# Patient Record
Sex: MALE | Race: WHITE | NOT HISPANIC OR LATINO | Employment: OTHER | URBAN - NONMETROPOLITAN AREA
[De-identification: names, ages, dates, MRNs, and addresses within clinical notes are randomized per-mention and may not be internally consistent; named-entity substitution may affect disease eponyms.]

---

## 2017-05-12 ENCOUNTER — TRANSCRIBE ORDERS (OUTPATIENT)
Dept: ADMINISTRATIVE | Facility: HOSPITAL | Age: 82
End: 2017-05-12

## 2017-05-12 DIAGNOSIS — J98.4 OTHER DISEASES OF LUNG, NOT ELSEWHERE CLASSIFIED: Primary | ICD-10-CM

## 2017-05-18 ENCOUNTER — HOSPITAL ENCOUNTER (OUTPATIENT)
Dept: CT IMAGING | Facility: HOSPITAL | Age: 82
Discharge: HOME | End: 2017-05-18
Attending: INTERNAL MEDICINE | Admitting: INTERNAL MEDICINE

## 2017-05-18 DIAGNOSIS — J98.4 OTHER DISEASES OF LUNG, NOT ELSEWHERE CLASSIFIED: ICD-10-CM

## 2017-05-18 PROCEDURE — 71250 CT THORAX DX C-: CPT

## 2019-01-01 ENCOUNTER — APPOINTMENT (OUTPATIENT)
Dept: GENERAL RADIOLOGY | Facility: HOSPITAL | Age: 84
End: 2019-01-01

## 2019-01-01 ENCOUNTER — HOSPITAL ENCOUNTER (INPATIENT)
Facility: HOSPITAL | Age: 84
LOS: 3 days | End: 2019-08-01
Attending: FAMILY MEDICINE | Admitting: INTERNAL MEDICINE

## 2019-01-01 VITALS
SYSTOLIC BLOOD PRESSURE: 92 MMHG | RESPIRATION RATE: 14 BRPM | WEIGHT: 105.1 LBS | HEIGHT: 71 IN | TEMPERATURE: 97.4 F | HEART RATE: 72 BPM | DIASTOLIC BLOOD PRESSURE: 56 MMHG | OXYGEN SATURATION: 96 % | BODY MASS INDEX: 14.71 KG/M2

## 2019-01-01 DIAGNOSIS — R13.10 DYSPHAGIA, UNSPECIFIED TYPE: ICD-10-CM

## 2019-01-01 DIAGNOSIS — J93.9 PNEUMOTHORAX, UNSPECIFIED TYPE: Primary | ICD-10-CM

## 2019-01-01 DIAGNOSIS — R94.31 ABNORMAL EKG: ICD-10-CM

## 2019-01-01 DIAGNOSIS — R13.12 OROPHARYNGEAL DYSPHAGIA: ICD-10-CM

## 2019-01-01 DIAGNOSIS — Z78.9 IMPAIRED MOBILITY AND ADLS: ICD-10-CM

## 2019-01-01 DIAGNOSIS — Z74.09 DECREASED FUNCTIONAL MOBILITY AND ENDURANCE: ICD-10-CM

## 2019-01-01 DIAGNOSIS — R09.02 HYPOXIA: ICD-10-CM

## 2019-01-01 DIAGNOSIS — Z74.09 IMPAIRED MOBILITY AND ADLS: ICD-10-CM

## 2019-01-01 DIAGNOSIS — N30.00 ACUTE CYSTITIS WITHOUT HEMATURIA: ICD-10-CM

## 2019-01-01 LAB
A-A DO2: ABNORMAL MMHG
ALBUMIN SERPL-MCNC: 3 G/DL (ref 3.5–5)
ALBUMIN SERPL-MCNC: 3.2 G/DL (ref 3.5–5)
ALBUMIN/GLOB SERPL: 0.8 G/DL (ref 1.1–2.5)
ALBUMIN/GLOB SERPL: 0.8 G/DL (ref 1.1–2.5)
ALP SERPL-CCNC: 100 U/L (ref 24–120)
ALP SERPL-CCNC: 90 U/L (ref 24–120)
ALT SERPL W P-5'-P-CCNC: <15 U/L (ref 0–54)
ALT SERPL W P-5'-P-CCNC: <15 U/L (ref 0–54)
ANION GAP SERPL CALCULATED.3IONS-SCNC: 10 MMOL/L (ref 4–13)
ANION GAP SERPL CALCULATED.3IONS-SCNC: 14 MMOL/L (ref 4–13)
ANION GAP SERPL CALCULATED.3IONS-SCNC: 9 MMOL/L (ref 4–13)
APTT PPP: 40.5 SECONDS (ref 24.1–35)
ARTERIAL PATENCY WRIST A: ABNORMAL
ARTERIAL PATENCY WRIST A: POSITIVE
ARTICHOKE IGE QN: 35 MG/DL (ref 0–99)
AST SERPL-CCNC: 25 U/L (ref 7–45)
AST SERPL-CCNC: 30 U/L (ref 7–45)
ATMOSPHERIC PRESS: 750 MMHG
ATMOSPHERIC PRESS: 750 MMHG
BACTERIA SPEC AEROBE CULT: ABNORMAL
BACTERIA UR QL AUTO: ABNORMAL /HPF
BASE EXCESS BLDA CALC-SCNC: 4.5 MMOL/L (ref 0–2)
BASE EXCESS BLDA CALC-SCNC: 6.6 MMOL/L (ref 0–2)
BDY SITE: ABNORMAL
BDY SITE: ABNORMAL
BILIRUB SERPL-MCNC: 0.5 MG/DL (ref 0.1–1)
BILIRUB SERPL-MCNC: 0.5 MG/DL (ref 0.1–1)
BILIRUB UR QL STRIP: NEGATIVE
BODY TEMPERATURE: 37 C
BODY TEMPERATURE: 37 C
BUN BLD-MCNC: 24 MG/DL (ref 5–21)
BUN BLD-MCNC: 26 MG/DL (ref 5–21)
BUN BLD-MCNC: 35 MG/DL (ref 5–21)
BUN/CREAT SERPL: 25.5 (ref 7–25)
BUN/CREAT SERPL: 28.9 (ref 7–25)
BUN/CREAT SERPL: 41.2 (ref 7–25)
CALCIUM SPEC-SCNC: 8.7 MG/DL (ref 8.4–10.4)
CALCIUM SPEC-SCNC: 8.8 MG/DL (ref 8.4–10.4)
CALCIUM SPEC-SCNC: 9.2 MG/DL (ref 8.4–10.4)
CHLORIDE SERPL-SCNC: 102 MMOL/L (ref 98–110)
CHLORIDE SERPL-SCNC: 107 MMOL/L (ref 98–110)
CHLORIDE SERPL-SCNC: 110 MMOL/L (ref 98–110)
CHOLEST SERPL-MCNC: 124 MG/DL (ref 130–200)
CLARITY UR: ABNORMAL
CLUMPED PLATELETS: PRESENT
CO2 SERPL-SCNC: 23 MMOL/L (ref 24–31)
CO2 SERPL-SCNC: 29 MMOL/L (ref 24–31)
CO2 SERPL-SCNC: 31 MMOL/L (ref 24–31)
COHGB MFR BLD: 0.9 % (ref 0–5)
COLOR UR: ABNORMAL
CORTIS SERPL-MCNC: 33.7 UG/DL
CORTIS SERPL-MCNC: 41.6 UG/DL
CORTIS SERPL-MCNC: 51 UG/DL
CREAT BLD-MCNC: 0.85 MG/DL (ref 0.5–1.4)
CREAT BLD-MCNC: 0.9 MG/DL (ref 0.5–1.4)
CREAT BLD-MCNC: 0.94 MG/DL (ref 0.5–1.4)
D DIMER PPP FEU-MCNC: 1.32 MG/L (FEU) (ref 0–0.5)
D-LACTATE SERPL-SCNC: 1.9 MMOL/L (ref 0.5–2)
D-LACTATE SERPL-SCNC: 3 MMOL/L (ref 0.5–2)
D-LACTATE SERPL-SCNC: 3.5 MMOL/L (ref 0.5–2)
DEPRECATED RDW RBC AUTO: 51.5 FL (ref 40–54)
DEPRECATED RDW RBC AUTO: 51.8 FL (ref 40–54)
DEPRECATED RDW RBC AUTO: 52 FL (ref 40–54)
ERYTHROCYTE [DISTWIDTH] IN BLOOD BY AUTOMATED COUNT: 15.3 % (ref 12–15)
GAS FLOW AIRWAY: 3 LPM
GAS FLOW AIRWAY: 30 LPM
GFR SERPL CREATININE-BSD FRML MDRD: 76 ML/MIN/1.73
GFR SERPL CREATININE-BSD FRML MDRD: 79 ML/MIN/1.73
GFR SERPL CREATININE-BSD FRML MDRD: 85 ML/MIN/1.73
GIANT PLATELETS: ABNORMAL
GLOBULIN UR ELPH-MCNC: 3.7 GM/DL
GLOBULIN UR ELPH-MCNC: 4 GM/DL
GLUCOSE BLD-MCNC: 114 MG/DL (ref 70–100)
GLUCOSE BLD-MCNC: 65 MG/DL (ref 70–100)
GLUCOSE BLD-MCNC: 96 MG/DL (ref 70–100)
GLUCOSE BLDC GLUCOMTR-MCNC: 102 MG/DL (ref 70–130)
GLUCOSE BLDC GLUCOMTR-MCNC: 113 MG/DL (ref 70–130)
GLUCOSE BLDC GLUCOMTR-MCNC: 161 MG/DL (ref 70–130)
GLUCOSE BLDC GLUCOMTR-MCNC: 62 MG/DL (ref 70–130)
GLUCOSE UR STRIP-MCNC: NEGATIVE MG/DL
HBA1C MFR BLD: 5.6 %
HCO3 BLDA-SCNC: 28.8 MMOL/L (ref 20–26)
HCO3 BLDA-SCNC: 29.8 MMOL/L (ref 20–26)
HCT VFR BLD AUTO: 31.2 % (ref 40–52)
HCT VFR BLD AUTO: 33.8 % (ref 40–52)
HCT VFR BLD AUTO: 35.4 % (ref 40–52)
HCT VFR BLD CALC: 33.9 % (ref 38–51)
HDLC SERPL-MCNC: 31 MG/DL
HGB BLD-MCNC: 10.8 G/DL (ref 14–18)
HGB BLD-MCNC: 11.2 G/DL (ref 14–18)
HGB BLD-MCNC: 9.8 G/DL (ref 14–18)
HGB BLDA-MCNC: 11.1 G/DL (ref 14–18)
HGB UR QL STRIP.AUTO: NEGATIVE
HOLD SPECIMEN: NORMAL
HOROWITZ INDEX BLD+IHG-RTO: 40 %
HYALINE CASTS UR QL AUTO: ABNORMAL /LPF
INR PPP: 1.41 (ref 0.91–1.09)
KETONES UR QL STRIP: ABNORMAL
LDH SERPL-CCNC: 343 U/L (ref 265–665)
LDLC/HDLC SERPL: 2.48 {RATIO}
LEUKOCYTE ESTERASE UR QL STRIP.AUTO: ABNORMAL
LYMPHOCYTES # BLD MANUAL: 0.22 10*3/MM3 (ref 0.72–4.86)
LYMPHOCYTES # BLD MANUAL: 1.16 10*3/MM3 (ref 0.72–4.86)
LYMPHOCYTES NFR BLD MANUAL: 1 % (ref 15–45)
LYMPHOCYTES NFR BLD MANUAL: 3 % (ref 4–12)
LYMPHOCYTES NFR BLD MANUAL: 3 % (ref 4–12)
LYMPHOCYTES NFR BLD MANUAL: 5 % (ref 15–45)
Lab: ABNORMAL
MCH RBC QN AUTO: 29.1 PG (ref 28–32)
MCH RBC QN AUTO: 29.1 PG (ref 28–32)
MCH RBC QN AUTO: 29.2 PG (ref 28–32)
MCHC RBC AUTO-ENTMCNC: 31.4 G/DL (ref 33–36)
MCHC RBC AUTO-ENTMCNC: 31.6 G/DL (ref 33–36)
MCHC RBC AUTO-ENTMCNC: 32 G/DL (ref 33–36)
MCV RBC AUTO: 91.4 FL (ref 82–95)
MCV RBC AUTO: 91.9 FL (ref 82–95)
MCV RBC AUTO: 92.6 FL (ref 82–95)
METHGB BLD QL: 0.7 % (ref 0–3)
MODALITY: ABNORMAL
MODALITY: ABNORMAL
MONOCYTES # BLD AUTO: 0.66 10*3/MM3 (ref 0.19–1.3)
MONOCYTES # BLD AUTO: 0.69 10*3/MM3 (ref 0.19–1.3)
NEUTROPHILS # BLD AUTO: 20.87 10*3/MM3 (ref 1.87–8.4)
NEUTROPHILS # BLD AUTO: 21.21 10*3/MM3 (ref 1.87–8.4)
NEUTROPHILS NFR BLD MANUAL: 89.1 % (ref 39–78)
NEUTROPHILS NFR BLD MANUAL: 92 % (ref 39–78)
NEUTS BAND NFR BLD MANUAL: 1 % (ref 0–10)
NEUTS BAND NFR BLD MANUAL: 4 % (ref 0–10)
NITRITE UR QL STRIP: POSITIVE
NOTE: ABNORMAL
NOTIFIED BY: ABNORMAL
NOTIFIED WHO: ABNORMAL
NRBC BLD AUTO-RTO: 0 /100 WBC (ref 0–0.2)
NT-PROBNP SERPL-MCNC: 1570 PG/ML (ref 0–1800)
OXYHGB MFR BLDV: 86.7 % (ref 94–99)
PCO2 BLDA: 36.4 MM HG (ref 35–45)
PCO2 BLDA: 40.9 MM HG (ref 35–45)
PCO2 TEMP ADJ BLD: ABNORMAL MM HG (ref 35–45)
PH BLDA: 7.46 PH UNITS (ref 7.35–7.45)
PH BLDA: 7.52 PH UNITS (ref 7.35–7.45)
PH UR STRIP.AUTO: <=5 [PH] (ref 5–8)
PH, TEMP CORRECTED: ABNORMAL PH UNITS (ref 7.35–7.45)
PLATELET # BLD AUTO: 365 10*3/MM3 (ref 130–400)
PLATELET # BLD AUTO: 419 10*3/MM3 (ref 130–400)
PLATELET # BLD AUTO: 449 10*3/MM3 (ref 130–400)
PMV BLD AUTO: 9.4 FL (ref 6–12)
PMV BLD AUTO: 9.4 FL (ref 6–12)
PMV BLD AUTO: 9.7 FL (ref 6–12)
PO2 BLDA: 50.1 MM HG (ref 83–108)
PO2 BLDA: 92.2 MM HG (ref 83–108)
PO2 TEMP ADJ BLD: ABNORMAL MM HG (ref 83–108)
POLYCHROMASIA BLD QL SMEAR: ABNORMAL
POTASSIUM BLD-SCNC: 3.7 MMOL/L (ref 3.5–5.3)
POTASSIUM BLD-SCNC: 3.7 MMOL/L (ref 3.5–5.3)
POTASSIUM BLD-SCNC: 3.8 MMOL/L (ref 3.5–5.3)
POTASSIUM BLDA-SCNC: 3.6 MMOL/L (ref 3.5–5.2)
PREALB SERPL-MCNC: 5.9 MG/DL (ref 18–36)
PROMYELOCYTES NFR BLD MANUAL: 1 % (ref 0–0)
PROT SERPL-MCNC: 6.7 G/DL (ref 6.3–8.7)
PROT SERPL-MCNC: 7.2 G/DL (ref 6.3–8.7)
PROT UR QL STRIP: ABNORMAL
PROTHROMBIN TIME: 17.7 SECONDS (ref 11.9–14.6)
RBC # BLD AUTO: 3.37 10*6/MM3 (ref 4.8–5.9)
RBC # BLD AUTO: 3.7 10*6/MM3 (ref 4.8–5.9)
RBC # BLD AUTO: 3.85 10*6/MM3 (ref 4.8–5.9)
RBC # UR: ABNORMAL /HPF
RBC MORPH BLD: NORMAL
REF LAB TEST METHOD: ABNORMAL
SAO2 % BLDCOA: 88.2 % (ref 94–99)
SAO2 % BLDCOA: 97.5 % (ref 94–99)
SMALL PLATELETS BLD QL SMEAR: ABNORMAL
SODIUM BLD-SCNC: 143 MMOL/L (ref 135–145)
SODIUM BLD-SCNC: 145 MMOL/L (ref 135–145)
SODIUM BLD-SCNC: 147 MMOL/L (ref 135–145)
SODIUM BLDA-SCNC: 145 MMOL/L (ref 136–145)
SP GR UR STRIP: 1.03 (ref 1–1.03)
SQUAMOUS #/AREA URNS HPF: ABNORMAL /HPF
T3FREE SERPL-MCNC: 1 PG/ML (ref 2–4.4)
T4 FREE SERPL-MCNC: 0.66 NG/DL (ref 0.78–2.19)
TRIGL SERPL-MCNC: 80 MG/DL (ref 0–149)
TROPONIN I SERPL-MCNC: <0.012 NG/ML (ref 0–0.03)
TSH SERPL DL<=0.05 MIU/L-ACNC: 14.9 MIU/ML (ref 0.47–4.68)
UROBILINOGEN UR QL STRIP: ABNORMAL
VARIANT LYMPHS NFR BLD MANUAL: 1 % (ref 0–5)
VENTILATOR MODE: ABNORMAL
VENTILATOR MODE: ABNORMAL
WBC MORPH BLD: NORMAL
WBC MORPH BLD: NORMAL
WBC NRBC COR # BLD: 19.77 10*3/MM3 (ref 4.8–10.8)
WBC NRBC COR # BLD: 22.09 10*3/MM3 (ref 4.8–10.8)
WBC NRBC COR # BLD: 23.16 10*3/MM3 (ref 4.8–10.8)
WBC UR QL AUTO: ABNORMAL /HPF
WHOLE BLOOD HOLD SPECIMEN: NORMAL
WHOLE BLOOD HOLD SPECIMEN: NORMAL

## 2019-01-01 PROCEDURE — 85027 COMPLETE CBC AUTOMATED: CPT | Performed by: NURSE PRACTITIONER

## 2019-01-01 PROCEDURE — 85379 FIBRIN DEGRADATION QUANT: CPT | Performed by: NURSE PRACTITIONER

## 2019-01-01 PROCEDURE — 94799 UNLISTED PULMONARY SVC/PX: CPT

## 2019-01-01 PROCEDURE — 25010000002 LEVOFLOXACIN PER 250 MG: Performed by: NURSE PRACTITIONER

## 2019-01-01 PROCEDURE — 93005 ELECTROCARDIOGRAM TRACING: CPT | Performed by: NURSE PRACTITIONER

## 2019-01-01 PROCEDURE — 92611 MOTION FLUOROSCOPY/SWALLOW: CPT

## 2019-01-01 PROCEDURE — 94760 N-INVAS EAR/PLS OXIMETRY 1: CPT

## 2019-01-01 PROCEDURE — 83050 HGB METHEMOGLOBIN QUAN: CPT

## 2019-01-01 PROCEDURE — 85730 THROMBOPLASTIN TIME PARTIAL: CPT | Performed by: NURSE PRACTITIONER

## 2019-01-01 PROCEDURE — 87086 URINE CULTURE/COLONY COUNT: CPT | Performed by: NURSE PRACTITIONER

## 2019-01-01 PROCEDURE — 36600 WITHDRAWAL OF ARTERIAL BLOOD: CPT

## 2019-01-01 PROCEDURE — 94640 AIRWAY INHALATION TREATMENT: CPT

## 2019-01-01 PROCEDURE — 97166 OT EVAL MOD COMPLEX 45 MIN: CPT | Performed by: OCCUPATIONAL THERAPIST

## 2019-01-01 PROCEDURE — 74230 X-RAY XM SWLNG FUNCJ C+: CPT

## 2019-01-01 PROCEDURE — 82803 BLOOD GASES ANY COMBINATION: CPT

## 2019-01-01 PROCEDURE — 71045 X-RAY EXAM CHEST 1 VIEW: CPT

## 2019-01-01 PROCEDURE — 92610 EVALUATE SWALLOWING FUNCTION: CPT

## 2019-01-01 PROCEDURE — 82375 ASSAY CARBOXYHB QUANT: CPT

## 2019-01-01 PROCEDURE — 87040 BLOOD CULTURE FOR BACTERIA: CPT | Performed by: NURSE PRACTITIONER

## 2019-01-01 PROCEDURE — 85025 COMPLETE CBC W/AUTO DIFF WBC: CPT | Performed by: NURSE PRACTITIONER

## 2019-01-01 PROCEDURE — 82805 BLOOD GASES W/O2 SATURATION: CPT

## 2019-01-01 PROCEDURE — 93005 ELECTROCARDIOGRAM TRACING: CPT | Performed by: FAMILY MEDICINE

## 2019-01-01 PROCEDURE — 80048 BASIC METABOLIC PNL TOTAL CA: CPT | Performed by: NURSE PRACTITIONER

## 2019-01-01 PROCEDURE — 80061 LIPID PANEL: CPT | Performed by: NURSE PRACTITIONER

## 2019-01-01 PROCEDURE — 85007 BL SMEAR W/DIFF WBC COUNT: CPT | Performed by: NURSE PRACTITIONER

## 2019-01-01 PROCEDURE — 82962 GLUCOSE BLOOD TEST: CPT

## 2019-01-01 PROCEDURE — 25010000002 COSYNTROPIN PER 0.25 MG: Performed by: INTERNAL MEDICINE

## 2019-01-01 PROCEDURE — 83880 ASSAY OF NATRIURETIC PEPTIDE: CPT | Performed by: NURSE PRACTITIONER

## 2019-01-01 PROCEDURE — 84439 ASSAY OF FREE THYROXINE: CPT | Performed by: NURSE PRACTITIONER

## 2019-01-01 PROCEDURE — 99285 EMERGENCY DEPT VISIT HI MDM: CPT

## 2019-01-01 PROCEDURE — 80053 COMPREHEN METABOLIC PANEL: CPT | Performed by: NURSE PRACTITIONER

## 2019-01-01 PROCEDURE — 83605 ASSAY OF LACTIC ACID: CPT | Performed by: NURSE PRACTITIONER

## 2019-01-01 PROCEDURE — 25010000002 FLUCONAZOLE PER 200 MG: Performed by: NURSE PRACTITIONER

## 2019-01-01 PROCEDURE — 36415 COLL VENOUS BLD VENIPUNCTURE: CPT

## 2019-01-01 PROCEDURE — 84443 ASSAY THYROID STIM HORMONE: CPT | Performed by: NURSE PRACTITIONER

## 2019-01-01 PROCEDURE — 81001 URINALYSIS AUTO W/SCOPE: CPT | Performed by: NURSE PRACTITIONER

## 2019-01-01 PROCEDURE — 84484 ASSAY OF TROPONIN QUANT: CPT | Performed by: NURSE PRACTITIONER

## 2019-01-01 PROCEDURE — 93010 ELECTROCARDIOGRAM REPORT: CPT | Performed by: INTERNAL MEDICINE

## 2019-01-01 PROCEDURE — 84134 ASSAY OF PREALBUMIN: CPT | Performed by: NURSE PRACTITIONER

## 2019-01-01 PROCEDURE — 82533 TOTAL CORTISOL: CPT | Performed by: INTERNAL MEDICINE

## 2019-01-01 PROCEDURE — 84481 FREE ASSAY (FT-3): CPT | Performed by: NURSE PRACTITIONER

## 2019-01-01 PROCEDURE — 83605 ASSAY OF LACTIC ACID: CPT | Performed by: FAMILY MEDICINE

## 2019-01-01 PROCEDURE — 97162 PT EVAL MOD COMPLEX 30 MIN: CPT

## 2019-01-01 PROCEDURE — 83036 HEMOGLOBIN GLYCOSYLATED A1C: CPT | Performed by: NURSE PRACTITIONER

## 2019-01-01 PROCEDURE — 92526 ORAL FUNCTION THERAPY: CPT

## 2019-01-01 PROCEDURE — 83615 LACTATE (LD) (LDH) ENZYME: CPT | Performed by: INTERNAL MEDICINE

## 2019-01-01 PROCEDURE — 85610 PROTHROMBIN TIME: CPT | Performed by: NURSE PRACTITIONER

## 2019-01-01 RX ORDER — SODIUM CHLORIDE 0.9 % (FLUSH) 0.9 %
3 SYRINGE (ML) INJECTION EVERY 12 HOURS SCHEDULED
Status: DISCONTINUED | OUTPATIENT
Start: 2019-01-01 | End: 2019-01-01 | Stop reason: HOSPADM

## 2019-01-01 RX ORDER — NICOTINE POLACRILEX 4 MG
15 LOZENGE BUCCAL
Status: DISCONTINUED | OUTPATIENT
Start: 2019-01-01 | End: 2019-01-01 | Stop reason: HOSPADM

## 2019-01-01 RX ORDER — MORPHINE SULFATE 20 MG/ML
10 SOLUTION ORAL EVERY 4 HOURS PRN
Status: DISCONTINUED | OUTPATIENT
Start: 2019-01-01 | End: 2019-01-01 | Stop reason: HOSPADM

## 2019-01-01 RX ORDER — ACETAMINOPHEN 160 MG/5ML
650 SOLUTION ORAL EVERY 4 HOURS PRN
Status: DISCONTINUED | OUTPATIENT
Start: 2019-01-01 | End: 2019-01-01 | Stop reason: HOSPADM

## 2019-01-01 RX ORDER — LEVOFLOXACIN 5 MG/ML
500 INJECTION, SOLUTION INTRAVENOUS ONCE
Status: COMPLETED | OUTPATIENT
Start: 2019-01-01 | End: 2019-01-01

## 2019-01-01 RX ORDER — SODIUM CHLORIDE 9 MG/ML
75 INJECTION, SOLUTION INTRAVENOUS CONTINUOUS
Status: DISCONTINUED | OUTPATIENT
Start: 2019-01-01 | End: 2019-01-01

## 2019-01-01 RX ORDER — MEGESTROL ACETATE 40 MG/ML
800 SUSPENSION ORAL DAILY
Status: DISCONTINUED | OUTPATIENT
Start: 2019-01-01 | End: 2019-01-01

## 2019-01-01 RX ORDER — SODIUM CHLORIDE 0.9 % (FLUSH) 0.9 %
3-10 SYRINGE (ML) INJECTION AS NEEDED
Status: DISCONTINUED | OUTPATIENT
Start: 2019-01-01 | End: 2019-01-01 | Stop reason: HOSPADM

## 2019-01-01 RX ORDER — ONDANSETRON 4 MG/1
4 TABLET, FILM COATED ORAL EVERY 6 HOURS PRN
Status: DISCONTINUED | OUTPATIENT
Start: 2019-01-01 | End: 2019-01-01 | Stop reason: HOSPADM

## 2019-01-01 RX ORDER — FLUCONAZOLE 2 MG/ML
200 INJECTION, SOLUTION INTRAVENOUS EVERY 24 HOURS
Status: DISCONTINUED | OUTPATIENT
Start: 2019-01-01 | End: 2019-01-01

## 2019-01-01 RX ORDER — MORPHINE SULFATE 2 MG/ML
1 INJECTION, SOLUTION INTRAMUSCULAR; INTRAVENOUS
Status: DISCONTINUED | OUTPATIENT
Start: 2019-01-01 | End: 2019-01-01 | Stop reason: HOSPADM

## 2019-01-01 RX ORDER — SODIUM CHLORIDE 0.9 % (FLUSH) 0.9 %
10 SYRINGE (ML) INJECTION AS NEEDED
Status: DISCONTINUED | OUTPATIENT
Start: 2019-01-01 | End: 2019-01-01 | Stop reason: HOSPADM

## 2019-01-01 RX ORDER — LEVOFLOXACIN 5 MG/ML
250 INJECTION, SOLUTION INTRAVENOUS EVERY 24 HOURS
Status: DISCONTINUED | OUTPATIENT
Start: 2019-08-29 | End: 2019-01-01

## 2019-01-01 RX ORDER — COSYNTROPIN 0.25 MG/ML
0.25 INJECTION, POWDER, FOR SOLUTION INTRAMUSCULAR; INTRAVENOUS ONCE
Status: COMPLETED | OUTPATIENT
Start: 2019-01-01 | End: 2019-01-01

## 2019-01-01 RX ORDER — FLUORIDE TOOTHPASTE
15 TOOTHPASTE DENTAL
Status: DISCONTINUED | OUTPATIENT
Start: 2019-01-01 | End: 2019-01-01 | Stop reason: HOSPADM

## 2019-01-01 RX ORDER — LIDOCAINE 50 MG/G
2 PATCH TOPICAL
Status: DISCONTINUED | OUTPATIENT
Start: 2019-01-01 | End: 2019-01-01 | Stop reason: HOSPADM

## 2019-01-01 RX ORDER — ALPRAZOLAM 0.5 MG/1
0.5 TABLET ORAL 2 TIMES DAILY PRN
Status: DISCONTINUED | OUTPATIENT
Start: 2019-01-01 | End: 2019-01-01 | Stop reason: HOSPADM

## 2019-01-01 RX ORDER — LEVOFLOXACIN 5 MG/ML
250 INJECTION, SOLUTION INTRAVENOUS EVERY 24 HOURS
Status: DISCONTINUED | OUTPATIENT
Start: 2019-01-01 | End: 2019-01-01

## 2019-01-01 RX ORDER — SODIUM CHLORIDE 0.9 % (FLUSH) 0.9 %
10 SYRINGE (ML) INJECTION AS NEEDED
Status: DISCONTINUED | OUTPATIENT
Start: 2019-01-01 | End: 2019-01-01 | Stop reason: SDUPTHER

## 2019-01-01 RX ORDER — DEXTROSE MONOHYDRATE 50 MG/ML
75 INJECTION, SOLUTION INTRAVENOUS CONTINUOUS
Status: DISCONTINUED | OUTPATIENT
Start: 2019-01-01 | End: 2019-01-01

## 2019-01-01 RX ORDER — ONDANSETRON 2 MG/ML
4 INJECTION INTRAMUSCULAR; INTRAVENOUS EVERY 6 HOURS PRN
Status: DISCONTINUED | OUTPATIENT
Start: 2019-01-01 | End: 2019-01-01 | Stop reason: HOSPADM

## 2019-01-01 RX ORDER — DEXTROSE MONOHYDRATE 25 G/50ML
25 INJECTION, SOLUTION INTRAVENOUS
Status: DISCONTINUED | OUTPATIENT
Start: 2019-01-01 | End: 2019-01-01 | Stop reason: HOSPADM

## 2019-01-01 RX ADMIN — LEVOFLOXACIN 250 MG: 5 INJECTION, SOLUTION INTRAVENOUS at 17:54

## 2019-01-01 RX ADMIN — Medication 15 ML: at 13:01

## 2019-01-01 RX ADMIN — IPRATROPIUM BROMIDE 0.5 MG: 0.5 SOLUTION RESPIRATORY (INHALATION) at 20:12

## 2019-01-01 RX ADMIN — BARIUM SULFATE 55 ML: 0.81 POWDER, FOR SUSPENSION ORAL at 10:59

## 2019-01-01 RX ADMIN — FLUCONAZOLE 200 MG: 200 INJECTION, SOLUTION INTRAVENOUS at 18:08

## 2019-01-01 RX ADMIN — IPRATROPIUM BROMIDE 0.5 MG: 0.5 SOLUTION RESPIRATORY (INHALATION) at 06:15

## 2019-01-01 RX ADMIN — SODIUM CHLORIDE 1000 ML: 9 INJECTION, SOLUTION INTRAVENOUS at 18:04

## 2019-01-01 RX ADMIN — SODIUM CHLORIDE, PRESERVATIVE FREE 3 ML: 5 INJECTION INTRAVENOUS at 21:13

## 2019-01-01 RX ADMIN — IPRATROPIUM BROMIDE 0.5 MG: 0.5 SOLUTION RESPIRATORY (INHALATION) at 20:00

## 2019-01-01 RX ADMIN — IPRATROPIUM BROMIDE 0.5 MG: 0.5 SOLUTION RESPIRATORY (INHALATION) at 14:25

## 2019-01-01 RX ADMIN — IPRATROPIUM BROMIDE 0.5 MG: 0.5 SOLUTION RESPIRATORY (INHALATION) at 14:00

## 2019-01-01 RX ADMIN — IPRATROPIUM BROMIDE 0.5 MG: 0.5 SOLUTION RESPIRATORY (INHALATION) at 14:22

## 2019-01-01 RX ADMIN — IPRATROPIUM BROMIDE 0.5 MG: 0.5 SOLUTION RESPIRATORY (INHALATION) at 10:23

## 2019-01-01 RX ADMIN — MEGESTROL ACETATE 800 MG: 40 SUSPENSION ORAL at 09:34

## 2019-01-01 RX ADMIN — COSYNTROPIN 0.25 MG: 0.25 INJECTION, POWDER, LYOPHILIZED, FOR SOLUTION INTRAMUSCULAR; INTRAVENOUS at 02:41

## 2019-01-01 RX ADMIN — IPRATROPIUM BROMIDE 0.5 MG: 0.5 SOLUTION RESPIRATORY (INHALATION) at 07:08

## 2019-01-01 RX ADMIN — LIDOCAINE 2 PATCH: 50 PATCH TOPICAL at 09:35

## 2019-01-01 RX ADMIN — DEXTROSE MONOHYDRATE 75 ML/HR: 50 INJECTION, SOLUTION INTRAVENOUS at 08:58

## 2019-01-01 RX ADMIN — SODIUM CHLORIDE 100 ML/HR: 9 INJECTION, SOLUTION INTRAVENOUS at 02:04

## 2019-01-01 RX ADMIN — LEVOFLOXACIN 500 MG: 5 INJECTION, SOLUTION INTRAVENOUS at 16:18

## 2019-01-01 RX ADMIN — LEVOFLOXACIN 250 MG: 5 INJECTION, SOLUTION INTRAVENOUS at 15:53

## 2019-01-01 RX ADMIN — BARIUM SULFATE 60 ML: 400 SUSPENSION ORAL at 10:00

## 2019-01-01 RX ADMIN — IPRATROPIUM BROMIDE 0.5 MG: 0.5 SOLUTION RESPIRATORY (INHALATION) at 10:40

## 2019-01-01 RX ADMIN — SODIUM CHLORIDE 75 ML/HR: 9 INJECTION, SOLUTION INTRAVENOUS at 04:52

## 2019-01-01 RX ADMIN — BARIUM SULFATE 20 ML: 400 PASTE ORAL at 10:00

## 2019-01-01 RX ADMIN — DEXTROSE MONOHYDRATE 25 G: 500 INJECTION PARENTERAL at 15:50

## 2019-01-01 RX ADMIN — SODIUM CHLORIDE 75 ML/HR: 9 INJECTION, SOLUTION INTRAVENOUS at 11:27

## 2019-01-01 RX ADMIN — LIDOCAINE 2 PATCH: 50 PATCH TOPICAL at 17:58

## 2019-01-01 RX ADMIN — IPRATROPIUM BROMIDE 0.5 MG: 0.5 SOLUTION RESPIRATORY (INHALATION) at 10:19

## 2019-01-01 RX ADMIN — SODIUM CHLORIDE, PRESERVATIVE FREE 3 ML: 5 INJECTION INTRAVENOUS at 11:54

## 2019-01-01 RX ADMIN — Medication 15 ML: at 13:37

## 2019-01-01 RX ADMIN — SODIUM CHLORIDE 100 ML/HR: 9 INJECTION, SOLUTION INTRAVENOUS at 16:19

## 2019-04-23 PROBLEM — J40 BRONCHITIS, NOT SPECIFIED AS ACUTE OR CHRONIC: Status: ACTIVE | Noted: 2019-01-01

## 2019-07-29 PROBLEM — E43 SEVERE MALNUTRITION (HCC): Status: ACTIVE | Noted: 2019-01-01

## 2019-07-29 PROBLEM — J96.01 ACUTE RESPIRATORY FAILURE WITH HYPOXIA (HCC): Status: ACTIVE | Noted: 2019-01-01

## 2019-07-29 PROBLEM — G93.41 METABOLIC ENCEPHALOPATHY: Status: ACTIVE | Noted: 2019-01-01

## 2019-07-29 PROBLEM — A41.9 SEPSIS (HCC): Status: ACTIVE | Noted: 2019-01-01

## 2019-07-29 PROBLEM — J93.9 PNEUMOTHORAX: Status: ACTIVE | Noted: 2019-01-01

## 2019-07-29 PROBLEM — N39.0 ACUTE UTI (URINARY TRACT INFECTION): Status: ACTIVE | Noted: 2019-01-01

## 2019-07-31 PROBLEM — D64.9 ANEMIA: Status: ACTIVE | Noted: 2019-01-01

## 2019-07-31 PROBLEM — E16.2 HYPOGLYCEMIA: Status: ACTIVE | Noted: 2019-01-01

## 2019-07-31 PROBLEM — E87.1 HYPONATREMIA: Status: ACTIVE | Noted: 2019-01-01

## 2019-08-01 NOTE — THERAPY DISCHARGE NOTE
Acute Care - Speech Language Pathology   Swallow Treatment Note/Discharge   [facility]     Patient Name: Aidan Garcia Jr.  : 6/3/1930  MRN: [id number]  Today's Date: 2019  Onset of Illness/Injury or Date of Surgery: 19     Referring Physician: CAITY Cope       Admit Date: 2019     SLP notified this AM that family was concerned with significant expectoration of unknown object following PO intake this AM. RN also observed and feels this was not of consumed food items, as pt only attempted oatmeal and fork mashed pears this AM. Visited room, son and grandson present. Reviewed risks for aspiration with any PO intake, given severity of swallow fx as noted on VFSS on 2019. Son verbalized difficulty understanding why pt's swallow fx two weeks ago was normal, yet now is so severe. SLP educated that increased dysphagia is likely impacted by recent pneumonia, as this respiratory supression can impact oropharyngeal swallow strength and fx. Family was educated that if mechanical soft diet is desired, which was started per MD following discussion with family on 2019 during which plan for home with hospice was decided, ST recommends that food items are fork mashed and that a condiment is added to each food item to increase bolus cohesion and hopefully diet toleration. They may also attempt pureed at home, yet a runny pureed is recommended at this time in hopes to this resulting in less pharyngeal residue. They were also educated that ST recommends liquids via small diameter spoon or via spoon to control bolus size and decrease risk for aspiration, as well as feeding only when alert, sitting upright during and 30 minutes following any PO intake, s/s of aspiration that would warrant rest time following further attempts at PO to allow for respiratory recovery. Printed education was also provided for such recommendations, as well as detailed education re: following a mechanical soft and pureed diet  consistency diet at home. RN presented meds via spoon at time of visit, with multiple swallows, weak throat clearing noted. Pt was also noted to have a slightly hyperextended neck posture and needed verbal and tactile cues to assist in obtaining neutral neck posture during PO. Pt remains at a high risk for aspiration with PO intake at this time. Plan is for discharge home with hospice; therefore, continued ST services are no longer appropriate at this time. MD to reconsult if changes or new concerns arise. Thanks!   Haylie Bravo CCC-SLP 8/1/2019 10:14 AM    Visit Dx:      ICD-10-CM ICD-9-CM   1. Pneumothorax, unspecified type J93.9 512.89   2. Abnormal EKG R94.31 794.31   3. Acute cystitis without hematuria N30.00 595.0   4. Hypoxia R09.02 799.02   5. Dysphagia, unspecified type R13.10 787.20   6. Oropharyngeal dysphagia R13.12 787.22   7. Impaired mobility and ADLs Z74.09 799.89   8. Decreased functional mobility and endurance Z74.09 780.99     Patient Active Problem List   Diagnosis   • Bronchitis, not specified as acute or chronic   • Pneumothorax   • Severe malnutrition (CMS/HCC)   • Sepsis (CMS/HCC)   • Acute UTI (urinary tract infection)   • Metabolic encephalopathy   • Acute respiratory failure with hypoxia (CMS/HCC)   • Anemia   • Hypoglycemia   • Hyponatremia       Therapy Treatment  Rehabilitation Treatment Summary     Row Name 08/01/19 0912             Treatment Time/Intention    Discipline  speech language pathologist  -TM      Document Type  therapy note (daily note)  -TM      Subjective Information  weakness  -TM      Mode of Treatment  individual therapy;speech-language pathology  -TM      Patient/Family Observations  Son and grandson present at time of session.  -TM      Care Plan Review  risks/benefits reviewed;current/potential barriers reviewed  -TM      Care Plan Review, Other Participant(s)  son;family;other (see comments) RN, Sonali, and Cora CAROLINA  -TM      Patient Effort  adequate  -TM       Recorded by [TM] Haylie Bravo CCC-SLP 08/01/19 1003      Row Name 08/01/19 0912             Pain Assessment    Additional Documentation  Pain Scale: FACES Pre/Post-Treatment (Group)  -TM      Recorded by [TM] Haylie Bravo CCC-SLP 08/01/19 1003      Row Name 08/01/19 0912             Pain Scale: FACES Pre/Post-Treatment    Pain: FACES Scale, Pretreatment  0-->no hurt  -TM      Pain: FACES Scale, Post-Treatment  0-->no hurt  -TM      Recorded by [TM] Haylie Bravo CCC-SLP 08/01/19 1003      Row Name 08/01/19 0912             Outcome Summary/Treatment Plan (SLP)    Daily Summary of Progress (SLP)  progress towards functional goals is fair  -TM      Barriers to Overall Progress (SLP)  Weakness  -TM      Plan for Continued Treatment (SLP)  Diet per MD of [facility] soft with thin liquids.   -TM      Anticipated Dischage Disposition  home;other (see comments) with hospice  -TM      Patient/Family Concerns, Anticipated Discharge Disposition (SLP)  Continued dysphagia  -TM      Recorded by [TM] Haylie Bravo CCC-SLP 08/01/19 1003        User Key  (r) = Recorded By, (t) = Taken By, (c) = Cosigned By    Initials Name Effective Dates Discipline    TM Haylie Bravo CCC-SLP 08/02/16 -  SLP        Outcome Summary  Outcome Summary/Treatment Plan (SLP)  Daily Summary of Progress (SLP): progress towards functional goals is fair (08/01/19 0912 : Haylie Bravo CCC-SLP)  Barriers to Overall Progress (SLP): Weakness (08/01/19 0912 : Haylie Bravo CCC-SLP)  Plan for Continued Treatment (SLP): Diet per MD of [facility] soft with thin liquids.  (08/01/19 0912 : Haylie Bravo CCC-SLP)  Anticipated Dischage Disposition: home, other (see comments)(with hospice) (08/01/19 1014 : Haylie Bravo CCC-SLP)  Patient/Family Concerns, Anticipated Discharge Disposition (SLP): Continued dysphagia (08/01/19 0912 : Haylie Bravo CCC-SLP)  Reason for Discharge: no further expectation of functional progress (08/01/19 1014 : Haylie Bravo  [facility])  SLP GOALS     Row Name 08/01/19 0912 07/30/19 1000          Labial Strengthening Goal 1 (SLP)    Activity (Labial Strengthening Goal 1, SLP)  increase labial tone  -TM  increase labial tone  -TM     Increase Labial Tone  labial resistance exercises  -TM  labial resistance exercises  -TM     [place]/Accuracy (Labial Strengthening Goal 1, SLP)  with moderate cues (50-74% accuracy)  -TM  with moderate cues (50-74% accuracy)  -TM     Time Frame (Labial Strengthening Goal 1, SLP)  by discharge  -TM  by discharge  -TM     Barriers (Labial Strengthening Goal 1, SLP)  Prior deficit  -TM  Prior deficit  -TM     Progress/Outcomes (Labial Strengthening Goal 1, SLP)  goal not met  -TM  goal ongoing  -TM        Lingual Strengthening Goal 1 (SLP)    Activity (Lingual Strengthening Goal 1, SLP)  increase lingual tone/sensation/control/coordination/movement  -TM  increase lingual tone/sensation/control/coordination/movement  -TM     Increase Lingual Tone/Sensation/Control/Coordination/Movement  lingual movement exercises  -TM  lingual movement exercises  -TM     [place]/Accuracy (Lingual Strengthening Goal 1, SLP)  with moderate cues (50-74% accuracy)  -TM  with moderate cues (50-74% accuracy)  -TM     Time Frame (Lingual Strengthening Goal 1, SLP)  by discharge  -TM  by discharge  -TM     Barriers (Lingual Strengthening Goal 1, SLP)  Prior deficit  -TM  Prior deficit  -TM     Progress/Outcomes (Lingual Strengthening Goal 1, SLP)  goal not met  -TM  goal ongoing  -TM        Pharyngeal Strengthening Exercise Goal 1 (SLP)    Activity (Pharyngeal Strengthening Goal 1, SLP)  increase timing;increase superior movement of the hyolaryngeal complex;increase anterior movement of the hyolaryngeal complex;increase squeeze/positive pressure generation;increase tongue base retraction  -TM  increase timing;increase superior movement of the hyolaryngeal complex;increase anterior movement of the hyolaryngeal complex;increase  squeeze/positive pressure generation;increase tongue base retraction  -TM     Increase Timing  gustatory stimulation (sour/cold)  -TM  gustatory stimulation (sour/cold)  -TM     Increase Superior Movement of the Hyolaryngeal Complex  falsetto;Mendelsohn  -TM  falsetto;Mendelsohn  -TM     Increase Anterior Movement of the Hyolaryngeal Complex  shaker  -TM  shaker  -TM     Increase Squeeze/Positive Pressure Generation  hard effortful swallow  -TM  hard effortful swallow  -TM     Increase Tongue Base Retraction  kenny  -TM  kenyn  -TM     [place]/Accuracy (Pharyngeal Strengthening Goal 1, SLP)  with moderate cues (50-74% accuracy)  -TM  with moderate cues (50-74% accuracy)  -TM     Time Frame (Pharyngeal Strengthening Goal 1, SLP)  by discharge  -TM  by discharge  -TM     Barriers (Pharyngeal Strengthening Goal 1, SLP)  Prior deficit  -TM  Prior deficit  -TM     Progress/Outcomes (Pharyngeal Strengthening Goal 1, SLP)  goal not met  -TM  goal ongoing  -TM       User Key  (r) = Recorded By, (t) = Taken By, (c) = Cosigned By    Initials Name Provider Type    TM Haylie Bravo CCC-SLP Speech and Language Pathologist          EDUCATION  The patient has been educated in the following areas:   Dysphagia (Swallowing Impairment).    SLP Recommendation and Plan  Daily Summary of Progress (SLP): progress towards functional goals is fair  Barriers to Overall Progress (SLP): Weakness  Plan for Continued Treatment (SLP): Diet per MD of [facility] soft with thin liquids.   Anticipated Dischage Disposition: home, other (see comments)(with hospice)  Patient/Family Concerns, Anticipated Discharge Disposition (SLP): Continued dysphagia     Reason for Discharge: no further expectation of functional progress           Time Calculation:   Time Calculation- SLP     Row Name 08/01/19 1013             Time Calculation- SLP    SLP Start Time  0912  -      SLP Stop Time  1000  -      SLP Time Calculation (min)  48 min  -      SLP  Received On  08/01/19  -        User Key  (r) = Recorded By, (t) = Taken By, (c) = Cosigned By    Initials Name Provider Type    Haylie Ann CCC-SLP Speech and Language Pathologist          Therapy Charges for Today     Code Description Service Date Service Provider Modifiers Qty    [id number]  ST TREATMENT SWALLOW 3 8/1/2019 Haylie Bravo CCC-SLP GN 1               SLP Discharge Summary  Anticipated Dischage Disposition: home, other (see comments)(with hospice)  Reason for Discharge: no further expectation of functional progress  Progress Toward Achieving Short/long Term Goals: goals not met within established timelines  Discharge Destination: home    ATUL Young  8/1/2019

## 2019-08-01 NOTE — PLAN OF CARE
Problem: Patient Care Overview  Goal: Plan of Care Review   08/01/19 0550   Coping/Psychosocial   Plan of Care Reviewed With patient   Plan of Care Review   Progress no change   OTHER   Outcome Summary Pt is weak and malnourished. Turned every 2 hours. Oriented to self. Pt c/o some discomfort on his rt side of his abdomen/chest. Pt declined tylenol. VSS. Safety maintained. Will contiue to monitor.       Problem: Fall Risk (Adult)  Goal: Absence of Fall  Outcome: Ongoing (interventions implemented as appropriate)   08/01/19 0550   Fall Risk (Adult)   Absence of Fall making progress toward outcome       Problem: Skin Injury Risk (Adult)  Goal: Skin Health and Integrity  Outcome: Ongoing (interventions implemented as appropriate)   08/01/19 0550   Skin Injury Risk (Adult)   Skin Health and Integrity making progress toward outcome       Problem: Sepsis/Septic Shock (Adult)  Goal: Signs and Symptoms of Listed Potential Problems Will be Absent, Minimized or Managed (Sepsis/Septic Shock)  Outcome: Ongoing (interventions implemented as appropriate)   08/01/19 0550   Goal/Outcome Evaluation   Problems Assessed (Sepsis) all   Problems Present (Sepsis) undernutrition

## 2019-08-01 NOTE — NURSING NOTE
Called to room by family.  Pt found with no resp or heartrate.  Confirmed by second RN Sharmila. Notified Cora CAROLINA.  Family at bedside. Declined offer for .House supervisor notified.

## 2019-08-01 NOTE — DISCHARGE SUMMARY
[facility] Medicine [facility]  DEATH SUMMARY       Date of Admission: 7/29/2019  Date of Death:  8/1/2019 at approximately 1700  Primary Care Physician: Nate Mahan MD    Presenting Problem/History of Present Illness:  Shortness of breath     Final Death Diagnoses:  Active Hospital Problems    Diagnosis   • **Sepsis (CMS/[facility])   • Anemia   • Hypoglycemia   • Hyponatremia   • Pneumothorax   • Severe malnutrition (CMS/[facility])   • Acute UTI (urinary tract infection)   • Metabolic encephalopathy   • Acute respiratory failure with hypoxia (CMS/[facility])     Consults: None    Procedures Performed: None    Pertinent Test Results:   Lab Results (all)     Procedure Component Value Units Date/Time    Blood Culture With KE - Blood, Hand, Left [[id number]] Collected:  07/29/19 1547    Specimen:  Blood from Hand, Left Updated:  08/01/19 1603     Blood Culture No growth at 3 days    Blood Culture With KE - Blood, Arm, Left [[id number]] Collected:  07/29/19 1500    Specimen:  Blood from Arm, Left Updated:  08/01/19 1530     Blood Culture No growth at 3 days     Comment: : [id number] Pilar Talisha MMeter ID: [id number]       T3, Free [[id number]]  (Abnormal) Collected:  07/30/19 0703    Specimen:  Blood Updated:  07/31/19 0810     T3, Free 1.0 pg/mL     Urine Culture - Urine, Urine, Clean Catch [[id number]]  (Abnormal) Collected:  07/29/19 1626    Specimen:  Urine, Clean Catch Updated:  07/31/19 0733     Urine Culture 70,000-80,000 CFU/mL Yeast isolated    Manual Differential [[id number]]  (Abnormal) Collected:  07/31/19 0555    Specimen:  Blood Updated:  07/31/19 0704     Neutrophil % 92.0 %      Lymphocyte % 1.0 %      Monocyte % 3.0 %      Bands %  4.0 %      Neutrophils Absolute 21.21 10*3/mm3      Lymphocytes Absolute 0.22 10*3/mm3      Monocytes Absolute 0.66 10*3/mm3      RBC Morphology Normal     WBC Morphology Normal     Platelet Estimate Increased     Giant Platelets Slight/1+     Basic Metabolic Panel [[id number]]  (Abnormal) Collected:  07/31/19 0555    Specimen:  Blood Updated:  07/31/19 0654     Glucose 65 mg/dL      BUN 35 mg/dL      Creatinine 0.85 mg/dL      Sodium 147 mmol/L      Potassium 3.7 mmol/L      Chloride 110 mmol/L      CO2 23.0 mmol/L      Calcium 8.7 mg/dL      eGFR Non African Amer 85 mL/min/1.73      BUN/Creatinine Ratio 41.2     Anion Gap 14.0 mmol/L     CBC Auto Differential [[id number]]  (Abnormal) Collected:  07/31/19 0555    Specimen:  Blood Updated:  07/31/19 0630     WBC 22.09 10*3/mm3      RBC 3.37 10*6/mm3      Hemoglobin 9.8 g/dL      Hematocrit 31.2 %      MCV 92.6 fL      MCH 29.1 pg      MCHC 31.4 g/dL      RDW 15.3 %      RDW-SD 51.8 fl      MPV 9.7 fL      Platelets 449 10*3/mm3      nRBC 0.0 /100 WBC     T4, Free [[id number]]  (Abnormal) Collected:  07/30/19 0311    Specimen:  Blood Updated:  07/30/19 1043     Free T4 0.66 ng/dL     Lactic Acid, Plasma [[id number]]  (Normal) Collected:  07/30/19 0703    Specimen:  Blood Updated:  07/30/19 0738     Lactate 1.9 mmol/L     Hemoglobin A1c [[id number]] Collected:  07/30/19 0311    Specimen:  Blood Updated:  07/30/19 0423     Hemoglobin A1C 5.6 %     TSH [[id number]]  (Abnormal) Collected:  07/30/19 0311    Specimen:  Blood Updated:  07/30/19 0407     TSH 14.900 mIU/mL     Lipid Panel [[id number]]  (Abnormal) Collected:  07/30/19 0311    Specimen:  Blood Updated:  07/30/19 0349     Total Cholesterol 124 mg/dL      Triglycerides 80 mg/dL      HDL Cholesterol 31 mg/dL      LDL Cholesterol  35 mg/dL      LDL/HDL Ratio 2.48    Lactate Dehydrogenase [[id number]]  (Normal) Collected:  07/30/19 0311    Specimen:  Blood Updated:  07/30/19 0338      U/L     Comprehensive Metabolic Panel [[id number]]  (Abnormal) Collected:  07/30/19 0311    Specimen:  Blood Updated:  07/30/19 0337     Glucose 96 mg/dL      BUN 26 mg/dL      Creatinine 0.90 mg/dL      Sodium 145 mmol/L      Potassium 3.8 mmol/L      Chloride 107 mmol/L       CO2 29.0 mmol/L      Calcium 8.8 mg/dL      Total Protein 6.7 g/dL      Albumin 3.00 g/dL      ALT (SGPT) <15 U/L      AST (SGOT) 25 U/L      Alkaline Phosphatase 90 U/L      Total Bilirubin 0.5 mg/dL      eGFR Non African Amer 79 mL/min/1.73      Globulin 3.7 gm/dL      A/G Ratio 0.8 g/dL      BUN/Creatinine Ratio 28.9     Anion Gap 9.0 mmol/L     CBC (No Diff) [[id number]]  (Abnormal) Collected:  07/30/19 0311    Specimen:  Blood Updated:  07/30/19 0326     WBC 19.77 10*3/mm3      RBC 3.70 10*6/mm3      Hemoglobin 10.8 g/dL      Hematocrit 33.8 %      MCV 91.4 fL      MCH 29.2 pg      MCHC 32.0 g/dL      RDW 15.3 %      RDW-SD 51.5 fl      MPV 9.4 fL      Platelets 365 10*3/mm3     Blood Gas, Arterial [[id number]]  (Abnormal) Collected:  07/30/19 0016    Specimen:  Arterial Blood Updated:  07/30/19 0024     Site Right Brachial     Dimitrios's Test N/A     pH, Arterial 7.456 pH units      Comment: 83 Value above reference range        pCO2, Arterial 40.9 mm Hg      pO2, Arterial 92.2 mm Hg      HCO3, Arterial 28.8 mmol/L      Comment: 83 Value above reference range        Base Excess, Arterial 4.5 mmol/L      Comment: 83 Value above reference range        O2 Saturation, Arterial 97.5 %      Temperature 37.0 C      Barometric Pressure for Blood Gas 750 mmHg      Modality Heated HFNC     FIO2 40 %      Flow Rate 30.0 lpm      Ventilator Mode NA     Collected by [id number]     Comment: Meter: [id number]     :  [id number]       Prealbumin [[id number]]  (Abnormal) Collected:  07/29/19 1500    Specimen:  Blood Updated:  07/29/19 2017     Prealbumin 5.9 mg/dL     Lactic Acid, Reflex [[id number]]  (Abnormal) Collected:  07/29/19 1847    Specimen:  Blood Updated:  07/29/19 1914     Lactate 3.0 mmol/L     Lactic Acid, Reflex Timer (This will reflex a repeat order 3-3:15 hours after ordered.) [[id number]] Collected:  07/29/19 1459    Specimen:  Blood Updated:  07/29/19 1830     Extra Tube Hold for add-ons.     Comment: Auto  resulted.       Urinalysis, Microscopic Only - Urine, Clean Catch [[id number]]  (Abnormal) Collected:  07/29/19 1626    Specimen:  Urine, Clean Catch Updated:  07/29/19 1651     RBC, UA 3-5 /HPF      WBC, UA Too Numerous to Count /HPF      Bacteria, UA 1+ /HPF      Squamous Epithelial Cells, UA 13-20 /HPF      Hyaline Casts, UA None Seen /LPF      Methodology Manual Light Microscopy    Urinalysis With Culture If Indicated - Urine, Clean Catch [[id number]]  (Abnormal) Collected:  07/29/19 1626    Specimen:  Urine, Clean Catch Updated:  07/29/19 1651     Color, UA Dark Yellow     Appearance, UA Cloudy     pH, UA <=5.0     Specific Gravity, UA 1.026     Glucose, UA Negative     Ketones, UA Trace     Bilirubin, UA Negative     Blood, UA Negative     Protein, UA 30 mg/dL (1+)     Leuk Esterase, UA Moderate (2+)     Nitrite, UA Positive     Urobilinogen, UA 0.2 E.U./dL    Blood Gas, Arterial With Co-Ox [[id number]]  (Abnormal) Collected:  07/29/19 1500    Specimen:  Arterial Blood Updated:  07/29/19 1647     Site Right Radial     Dimitrios's Test Positive     pH, Arterial 7.522 pH units      Comment: 83 Value above reference range        pCO2, Arterial 36.4 mm Hg      pO2, Arterial 50.1 mm Hg      Comment: 85 Value below critical limit        HCO3, Arterial 29.8 mmol/L      Comment: 83 Value above reference range        Base Excess, Arterial 6.6 mmol/L      Comment: 83 Value above reference range        O2 Saturation, Arterial 88.2 %      Comment: 84 Value below reference range        Hemoglobin, Blood Gas 11.1 g/dL      Comment: 84 Value below reference range        Hematocrit, Blood Gas 33.9 %      Comment: 84 Value below reference range        Oxyhemoglobin 86.7 %      Comment: 84 Value below reference range        Methemoglobin 0.70 %      Carboxyhemoglobin 0.9 %      A-a Gradiant -- mmHg      Comment: UNABLE TO CALCULATE        Temperature 37.0 C      Sodium, Arterial 145 mmol/L      Comment: 83 Value above reference range         Potassium, Arterial 3.6 mmol/L      Barometric Pressure for Blood Gas 750 mmHg      Modality Nasal Cannula     Flow Rate 3.0 lpm      Ventilator Mode NA     Note --     Notified Who PATI CAROLINA     Notified By [id number]     Notified Time 07/29/2019 15:15     Collected by [id number]     Comment: Meter: [id number]     :  [id number]        pH, Temp Corrected -- pH Units      pCO2, Temperature Corrected -- mm Hg      pO2, Temperature Corrected -- mm Hg     [place] Draw [[id number]] Collected:  07/29/19 1459    Specimen:  Blood Updated:  07/29/19 1603    BNP [[id number]]  (Normal) Collected:  07/29/19 1459    Specimen:  Blood Updated:  07/29/19 1541     proBNP 1,570.0 pg/mL     Troponin [[id number]]  (Normal) Collected:  07/29/19 1459    Specimen:  Blood Updated:  07/29/19 1541     Troponin I <0.012 ng/mL     CBC & Differential [[id number]] Collected:  07/29/19 1459    Specimen:  Blood Updated:  07/29/19 1534    Narrative:       The following orders were created for panel order CBC & Differential.  Procedure                               Abnormality         Status                     ---------                               -----------         ------                     CBC Auto Differential[[id number]]        Abnormal            Final result                 Please view results for these tests on the individual orders.    CBC Auto Differential [[id number]]  (Abnormal) Collected:  07/29/19 1459    Specimen:  Blood Updated:  07/29/19 1534     WBC 23.16 10*3/mm3      RBC 3.85 10*6/mm3      Hemoglobin 11.2 g/dL      Hematocrit 35.4 %      MCV 91.9 fL      MCH 29.1 pg      MCHC 31.6 g/dL      RDW 15.3 %      RDW-SD 52.0 fl      MPV 9.4 fL      Platelets 419 10*3/mm3     Manual Differential [[id number]]  (Abnormal) Collected:  07/29/19 1459    Specimen:  Blood Updated:  07/29/19 1534     Neutrophil % 89.1 %      Lymphocyte % 5.0 %      Monocyte % 3.0 %      Bands %  1.0 %      Promyelocyte % 1.0 %      Atypical  Lymphocyte % 1.0 %      Neutrophils Absolute 20.87 10*3/mm3      Lymphocytes Absolute 1.16 10*3/mm3      Monocytes Absolute 0.69 10*3/mm3      Polychromasia Slight/1+     WBC Morphology Normal     Clumped Platelets Present    D-dimer, Quantitative [[id number]]  (Abnormal) Collected:  07/29/19 1500    Specimen:  Blood Updated:  07/29/19 1526     D-Dimer, Quantitative 1.32 mg/L (FEU)     Narrative:       Reference Range is 0-0.50 mg/L FEU. However, results <0.50 mg/L FEU tends to rule out DVT or PE. Results >0.50 mg/L FEU are not useful in predicting absence or presence of DVT or PE.    Protime-INR [[id number]]  (Abnormal) Collected:  07/29/19 1500    Specimen:  Blood Updated:  07/29/19 1526     Protime 17.7 Seconds      INR 1.41    aPTT [[id number]]  (Abnormal) Collected:  07/29/19 1500    Specimen:  Blood Updated:  07/29/19 1526     PTT 40.5 seconds     Lactic Acid, Plasma [[id number]]  (Abnormal) Collected:  07/29/19 1459    Specimen:  Blood Updated:  07/29/19 1526     Lactate 3.5 mmol/L     Comprehensive Metabolic Panel [[id number]]  (Abnormal) Collected:  07/29/19 1459    Specimen:  Blood Updated:  07/29/19 1523     Glucose 114 mg/dL      BUN 24 mg/dL      Creatinine 0.94 mg/dL      Sodium 143 mmol/L      Potassium 3.7 mmol/L      Chloride 102 mmol/L      CO2 31.0 mmol/L      Calcium 9.2 mg/dL      Total Protein 7.2 g/dL      Albumin 3.20 g/dL      ALT (SGPT) <15 U/L      AST (SGOT) 30 U/L      Alkaline Phosphatase 100 U/L      Total Bilirubin 0.5 mg/dL      eGFR Non African Amer 76 mL/min/1.73      Globulin 4.0 gm/dL      A/G Ratio 0.8 g/dL      BUN/Creatinine Ratio 25.5     Anion Gap 10.0 mmol/L     Narrative:       GFR Normal >60  Chronic Kidney Disease <60  Kidney Failure <15        Imaging Results (all)     Procedure Component Value Units Date/Time    XR Chest 1 View [[id number]] Collected:  07/31/19 0724     Updated:  07/31/19 0728    Narrative:       Frontal upright radiograph of the chest 7/31/2019 3:50 AM  CDT     COMPARISON: 07/30/2018     HISTORY: Right apical pneumothorax.     FINDINGS:   Hyperinflation is noted. Chronic interstitial changes present pulmonary  parenchyma.     Small 5-10% right apical pneumothorax is present stable and unchanged..  Cardiac silhouette is normal in size. Pleural thickening over the left  apex is again noted..      The osseous structures and surrounding soft tissues demonstrate no acute  abnormality.       Impression:       1. Stable 5-10% right apical pneumothorax.  2. COPD.        This report was finalized on 07/31/2019 07:25 by Dr. Harry Rojo MD.    FL Video Swallow With Speech [[id number]] Collected:  07/30/19 1007     Updated:  07/30/19 1551    Narrative:       FL VIDEO SWALLOW W SPEECH- 7/30/2019 9:49 AM CDT     REASON FOR EXAM: dysphagia; J93.9-Pneumothorax, unspecified;  R94.31-Abnormal electrocardiogram (ECG) (EKG); N30.00-Acute cystitis  without hematuria; R09.02-Hypoxemia; R13.10-Dysphagia, unspecified     COMPARISON: NONE      FLUOROSCOPY TIME: 1.4 minutes     TECHNIQUE: In conjunction with speech pathology services, video  fluoroscopic swallow examination was performed with oral ingestion of  barium containing substances of various viscosities.      FINDINGS: Medium bolus sizes were attempted. There was significant  pooling of contrast in the vallecula. Additionally, there was  penetration and aspiration with nectar thick consistency and residues.       Impression:       1. Abnormal swallowing mechanism.   2. Aspiration hemodynamically thick consistencies and residues.     Please see speech pathologist's report for further details and  recommendations.         This report was finalized on 07/30/2019 15:48 by Dr. Stephanie Neri MD.    XR Chest 1 View [[id number]] Collected:  07/30/19 0738     Updated:  07/30/19 0742    Narrative:       Frontal upright radiograph of the chest 7/30/2019 3:25 AM CDT     COMPARISON: 07/29/2019     HISTORY: Right pneumothorax.     FINDINGS:    Hyperinflation and chronic changes noted in the pulmonary parenchyma  bilaterally. There is incomplete expansion of the right lung. Right  apical pneumothorax is unchanged from July 29. There is approximately  10% right apical pneumothorax.. The cardiomediastinal silhouette and  pulmonary vascularity are within normal limits.      The osseous structures and surrounding soft tissues demonstrate no acute  abnormality.       Impression:       1. Right apical pneumothorax is noted. This is unchanged from July 29 is  approximately a 10% right apical pneumothorax.        2. COPD.        This report was finalized on 07/30/2019 07:39 by Dr. Harry Rojo MD.    XR Chest 1 View [[id number]] Collected:  07/29/19 1507     Updated:  07/29/19 1515    Narrative:       EXAMINATION: XR CHEST 1 VW-     7/29/2019 3:03 PM CDT     HISTORY: [facility] Triage Protocol     1 view chest x-ray compared with 04/11/2015.     Normal heart size.  Aortic arch calcification.     Chronic lung changes with hyperexpansion.  Focal opacity within the periphery of the midportion of the left lung is  indeterminate.  There is pleural thickening at the left apex.     Summary:  1. Small right pneumothorax.  2. Chronic lung changes with left lung nodular density.  3. Close follow-up of the small right-sided pneumothorax will be needed.  4. Report called to the emergency room at 3:11 PM.     Small right apical pneumothorax.     No  This report was finalized on 07/29/2019 15:12 by Dr. Juan Julien MD.        Hospital Course:  Mr. Garcia is a 89-year-old  male with a past medical history significant for obstructive sleep apnea, chronic pain, anxiety, depression, coronary artery disease, chronic obstructive pulmonary disease, and failure to thrive.  The patient was recently treated at an [facility] facility for aspiration pneumonia, pneumothorax, and pneumomediastinum.  He was discharged to a skilled nursing facility.  The patient presented to an [facility]  facility with concerns for altered mental status and complaints of shortness of breath on 7/29/2019.  A small right pneumothorax is again identified on imaging and it was felt the patient would be better served in a higher level of care therefore he was transferred to our facility.  In the emergency department, the patient was noted to have a PO2 of 50, lactic acid of 3.5, and urinalysis indicative of urinary tract infection.  The patient was noted to be severely cachectic weighing 47 kg.  The patient screened sepsis positive with heart rate greater than 90, white blood cell count greater than 12,000, and urinary source of infection.  The patient was admitted to the hospitalist service for further evaluation and management.    The patient was given an IV fluid bolus in the emergency department, followed by gentle IV fluid hydration thereafter.  He was started on antibiotic therapy with levofloxacin.  His urine culture ultimately grew yeast at which time fluconazole was added.  Blood cultures have shown no growth.  His pneumothorax remained stable on serial chest x-rays.    The patient's son did have concerns for dysphagia prompting speech therapy consult.  Video fluoroscopy swallowing study was performed which showed aspiration of all consistencies.  The patient was kept nothing by mouth.  After further speaking with he and his family, the patient stated he would not want any type of feeding tube for artificial nutrition and wanted to continue eating while accepting the risks of aspiration.  We also had a long discussion with both he and his family regarding his poor prognosis due to his severely malnourished state.  We felt the patient was at the end of his life, and both he and his family are in agreement with this assessment.  The patient did wish to return to skilled nursing facility under palliative care, unfortunately, the patient's respiratory status declined significantly throughout the day today and he did  ultimately  at 1700 today from respiratory failure likely resultant from aspiration pneumonia.    CAITY Simmons  19  5:08 PM    Time: 40 minutes    I personally evaluated and examined the patient in conjunction with CAITY Wilkes and agree with the assessment, treatment plan, and disposition of the patient as recorded by her. My history, exam, and further recommendations are:     Check to the patient numerous times prior to his passing, the patient appeared to be comfortable and did not require any morphine or any other comfort measures.  With discussion with the family, earlier in the day and on days prior, who agreed that he would likely benefit from hospice care at a nursing facility or at home.  However the past patient passed peacefully with family at bedside during the hospitalization.    Forrest Red MD  19  11:49 AM

## 2019-08-01 NOTE — NURSING NOTE
Pt now with moments of apnea, unresponsive to stimuli, Cora CAROLINA and Dr. Red here in room to see pt.  Son Kel called and aware pt had significant change in status

## 2019-08-01 NOTE — PROGRESS NOTES
Continued Stay Note   Nicole     Patient Name: Aidan Garcia Jr.  MRN: [id number]  Today's Date: 2019    Admit Date: 2019    Discharge Plan     Row Name 19 1558       Plan    Plan  EXPECTED TO     Patient/Family in Agreement with Plan  yes    Plan Comments  REVIEWED CHART; NOTED PT'S CHANGE OF CONDITION AND FAMILY NOTIFIED AND HERE WITH PT.          Discharge Codes    No documentation.             OPAL Bernstein

## 2019-08-01 NOTE — PLAN OF CARE
Problem: Patient Care Overview  Goal: Plan of Care Review  Outcome: Ongoing (interventions implemented as appropriate)   08/01/19 1004   Coping/Psychosocial   Plan of Care Reviewed With son;grandchild(atilio);other (see comments)  (RN, Sonali, and Cora CAROLINA)   Plan of Care Review   Progress no change   OTHER   Outcome Summary SLP notified this AM that family was concerned with significant expectoration of unknown object following PO intake this AM. RN also observed and feels this was not of consumed food items, as pt only attempted oatmeal and fork mashed pears this AM. Visited room, son and grandson present. Reviewed risks for aspiration with any PO intake, given severity of swallow fx as noted on [facility] on 07/30/2019. Son verbalized difficulty understanding why pt's swallow fx two weeks ago was normal, yet now is so severe. SLP educated that increased dysphagia is likely impacted by recent pneumonia, as this respiratory supression can impact oropharyngeal swallow strength and fx. Family was educated that if mechanical soft diet is desired, which was started per MD following discussion with family on 07/31/2019 during which plan for home with hospice was decided, ST recommends that food items are fork mashed and that a condiment is added to each food item to increase bolus cohesion and hopefully diet toleration. They may also attempt pureed at home, yet a runny pureed is recommended at this time in hopes to this resulting in less pharyngeal residue. They were also educated that ST recommends liquids via small diameter spoon or via spoon to control bolus size and decrease risk for aspiration, as well as feeding only when alert, sitting upright during and 30 minutes following any PO intake, s/s of aspiration that would warrant rest time following further attempts at PO to allow for respiratory recovery. Printed education was also provided for such recommendations, as well as detailed education re: following a mechanical  soft and pureed diet consistency diet at home. RN presented meds via spoon at time of visit, with multiple swallows, weak throat clearing noted. Pt was also noted to have a slightly hyperextended neck posture and needed verbal and tactile cues to assist in obtaining neutral neck posture during PO. Pt remains at a high risk for aspiration with PO intake at this time. Plan is for discharge home with hospice; therefore, continued ST services are no longer appropriate at this time. MD to reconsult if changes or new concerns arise. Thanks!

## 2019-08-02 NOTE — THERAPY DISCHARGE NOTE
Acute Care - Occupational Therapy Discharge Summary  [facility]     Patient Name: Aidan Garcia Jr.  : 6/3/1930  MRN: [id number]    Today's Date: 2019  Onset of Illness/Injury or Date of Surgery: 19    Date of Referral to OT: 19  Referring Physician: CAITY Cope       Admit Date: 2019        OT Recommendation and Plan    Visit Dx:    ICD-10-CM ICD-9-CM   1. Pneumothorax, unspecified type J93.9 512.89   2. Abnormal EKG R94.31 794.31   3. Acute cystitis without hematuria N30.00 595.0   4. Hypoxia R09.02 799.02   5. Dysphagia, unspecified type R13.10 787.20   6. Oropharyngeal dysphagia R13.12 787.22   7. Impaired mobility and ADLs Z74.09 799.89   8. Decreased functional mobility and endurance Z74.09 780.99               Rehab Goal Summary     Row Name 19 0800             Bed Mobility Goal 1 (OT)    Activity/Assistive Device (Bed Mobility Goal 1, OT)  bed mobility activities, all  -TS      Williams Level/Cues Needed (Bed Mobility Goal 1, OT)  minimum assist (75% or more patient effort);verbal cues required;tactile cues required;set-up required  -TS      Time Frame (Bed Mobility Goal 1, OT)  10 days  -TS      Progress/Outcomes (Bed Mobility Goal 1, OT)  goal not met  -TS         Dressing Goal 1 (OT)    Activity/Assistive Device (Dressing Goal 1, OT)  upper body dressing  -TS      Williams/Cues Needed (Dressing Goal 1, OT)  minimum assist (75% or more patient effort);verbal cues required;tactile cues required;set-up required  -TS      Time Frame (Dressing Goal 1, OT)  10 days  -TS      Progress/Outcome (Dressing Goal 1, OT)  goal not met  -TS         Grooming Goal 1 (OT)    Activity/Device (Grooming Goal 1, OT)  grooming skills, all  -TS      Williams (Grooming Goal 1, OT)  supervision required;set-up required  -TS      Time Frame (Grooming Goal 1, OT)  10 days  -TS      Progress/Outcome (Grooming Goal 1, OT)  goal not met  -TS        User Key  (r) = Recorded By, (t) = Taken  By, (c) = Cosigned By    Initials Name Provider Type Discipline    TS Lorene Barfield, OVIEDO/L Occupational Therapy Assistant OT          Outcome Measures     Row Name 19 1320 19 1700          How much help from another person do you currently need...    Turning from your back to your side while in flat bed without using bedrails?  3  -VERONICA (r) SC (t) VERONICA (c)  --     Moving from lying on back to sitting on the side of a flat bed without bedrails?  1  -VERONICA (r) SC (t) VERONICA (c)  --     Moving to and from a bed to a chair (including a wheelchair)?  1  -VERONICA (r) SC (t) VERONICA (c)  --     Standing up from a chair using your arms (e.g., wheelchair, bedside chair)?  1  -VERONICA (r) SC (t) VERONICA (c)  --     Climbing 3-5 steps with a railing?  1  -VERONICA (r) SC (t) VERONICA (c)  --     To walk in hospital room?  1  -VERONICA (r) SC (t) VERONICA (c)  --     AM-PAC 6 Clicks Score (PT)  8  -VERONICA (r) SC (t)  --        How much help from another is currently needed...    Putting on and taking off regular lower body clothing?  --  1  -MM     Bathing (including washing, rinsing, and drying)  --  1  -MM     Toileting (which includes using toilet bed pan or urinal)  --  1  -MM     Putting on and taking off regular upper body clothing  --  1  -MM     Taking care of personal grooming (such as brushing teeth)  --  2  -MM     Eating meals  --  2  -MM     AM-PAC 6 Clicks Score (OT)  --  8  -MM        Functional Assessment    Outcome Measure Options  AM-PAC 6 Clicks Basic Mobility (PT)  -VERONICA (r) SC (t) VERONICA (c)  AM-PAC 6 Clicks Daily Activity (OT)  -MM       User Key  (r) = Recorded By, (t) = Taken By, (c) = Cosigned By    Initials Name Provider Type    Janak Feliciano, PT DPT Physical Therapist    MM Dipak Cordoba, OTR/L Occupational Therapist    Speedy Baltazar, PT Student PT Student          Therapy Suggested Charges     Code   Minutes Charges    None                 OT Discharge Summary  Reason for Discharge: Patient   Outcomes Achieved: Refer  to plan of care for updates on goals achieved  Discharge Destination: other (comment)(pt )      KIERSTEN Mendosa  2019

## 2019-08-03 LAB
BACTERIA SPEC AEROBE CULT: NORMAL
BACTERIA SPEC AEROBE CULT: NORMAL